# Patient Record
Sex: MALE | URBAN - METROPOLITAN AREA
[De-identification: names, ages, dates, MRNs, and addresses within clinical notes are randomized per-mention and may not be internally consistent; named-entity substitution may affect disease eponyms.]

---

## 2020-06-24 ENCOUNTER — TELEPHONE (OUTPATIENT)
Dept: UROLOGY | Facility: MEDICAL CENTER | Age: 50
End: 2020-06-24

## 2023-10-03 ENCOUNTER — HOSPITAL ENCOUNTER (EMERGENCY)
Facility: HOSPITAL | Age: 53
Discharge: HOME | End: 2023-10-03
Attending: EMERGENCY MEDICINE | Admitting: EMERGENCY MEDICINE
Payer: COMMERCIAL

## 2023-10-03 ENCOUNTER — APPOINTMENT (EMERGENCY)
Dept: RADIOLOGY | Facility: HOSPITAL | Age: 53
End: 2023-10-03
Payer: COMMERCIAL

## 2023-10-03 VITALS
WEIGHT: 212 LBS | SYSTOLIC BLOOD PRESSURE: 132 MMHG | HEART RATE: 79 BPM | DIASTOLIC BLOOD PRESSURE: 64 MMHG | OXYGEN SATURATION: 97 % | RESPIRATION RATE: 18 BRPM | TEMPERATURE: 97.9 F

## 2023-10-03 DIAGNOSIS — M71.122 SEPTIC OLECRANON BURSITIS OF LEFT ELBOW: Primary | ICD-10-CM

## 2023-10-03 LAB
ALBUMIN SERPL BCP-MCNC: 3.9 G/DL (ref 3.5–5)
ALP SERPL-CCNC: 75 U/L (ref 34–104)
ALT SERPL W P-5'-P-CCNC: 20 U/L (ref 7–52)
ANION GAP SERPL CALCULATED.3IONS-SCNC: 7 MMOL/L
APTT PPP: 38 SECONDS (ref 23–37)
AST SERPL W P-5'-P-CCNC: 25 U/L (ref 13–39)
BASOPHILS # BLD AUTO: 0.02 THOUSANDS/ÂΜL (ref 0–0.1)
BASOPHILS NFR BLD AUTO: 0 % (ref 0–1)
BILIRUB SERPL-MCNC: 1.42 MG/DL (ref 0.2–1)
BUN SERPL-MCNC: 12 MG/DL (ref 5–25)
CALCIUM SERPL-MCNC: 9.2 MG/DL (ref 8.4–10.2)
CARDIAC TROPONIN I PNL SERPL HS: 3 NG/L
CHLORIDE SERPL-SCNC: 103 MMOL/L (ref 96–108)
CO2 SERPL-SCNC: 24 MMOL/L (ref 21–32)
CREAT SERPL-MCNC: 0.56 MG/DL (ref 0.6–1.3)
EOSINOPHIL # BLD AUTO: 0.09 THOUSAND/ÂΜL (ref 0–0.61)
EOSINOPHIL NFR BLD AUTO: 2 % (ref 0–6)
ERYTHROCYTE [DISTWIDTH] IN BLOOD BY AUTOMATED COUNT: 13.6 % (ref 11.6–15.1)
GFR SERPL CREATININE-BSD FRML MDRD: 118 ML/MIN/1.73SQ M
GLUCOSE SERPL-MCNC: 112 MG/DL (ref 65–140)
HCT VFR BLD AUTO: 41.8 % (ref 36.5–49.3)
HGB BLD-MCNC: 14.8 G/DL (ref 12–17)
HYPERCHROMIA BLD QL SMEAR: PRESENT
IMM GRANULOCYTES # BLD AUTO: 0.01 THOUSAND/UL (ref 0–0.2)
IMM GRANULOCYTES NFR BLD AUTO: 0 % (ref 0–2)
INR PPP: 1.25 (ref 0.84–1.19)
LACTATE SERPL-SCNC: 0.9 MMOL/L (ref 0.5–2)
LYMPHOCYTES # BLD AUTO: 0.96 THOUSANDS/ÂΜL (ref 0.6–4.47)
LYMPHOCYTES NFR BLD AUTO: 19 % (ref 14–44)
MCH RBC QN AUTO: 33 PG (ref 26.8–34.3)
MCHC RBC AUTO-ENTMCNC: 35.4 G/DL (ref 31.4–37.4)
MCV RBC AUTO: 93 FL (ref 82–98)
MONOCYTES # BLD AUTO: 0.46 THOUSAND/ÂΜL (ref 0.17–1.22)
MONOCYTES NFR BLD AUTO: 9 % (ref 4–12)
NEUTROPHILS # BLD AUTO: 3.51 THOUSANDS/ÂΜL (ref 1.85–7.62)
NEUTS SEG NFR BLD AUTO: 70 % (ref 43–75)
NRBC BLD AUTO-RTO: 0 /100 WBCS
PLATELET # BLD AUTO: 96 THOUSANDS/UL (ref 149–390)
PLATELET BLD QL SMEAR: ABNORMAL
PMV BLD AUTO: 10.2 FL (ref 8.9–12.7)
POTASSIUM SERPL-SCNC: 3.5 MMOL/L (ref 3.5–5.3)
PROCALCITONIN SERPL-MCNC: 0.11 NG/ML
PROT SERPL-MCNC: 7 G/DL (ref 6.4–8.4)
PROTHROMBIN TIME: 16.3 SECONDS (ref 11.6–14.5)
RBC # BLD AUTO: 4.48 MILLION/UL (ref 3.88–5.62)
RBC MORPH BLD: PRESENT
SODIUM SERPL-SCNC: 134 MMOL/L (ref 135–147)
WBC # BLD AUTO: 5.05 THOUSAND/UL (ref 4.31–10.16)

## 2023-10-03 PROCEDURE — 36415 COLL VENOUS BLD VENIPUNCTURE: CPT

## 2023-10-03 PROCEDURE — 84145 PROCALCITONIN (PCT): CPT | Performed by: EMERGENCY MEDICINE

## 2023-10-03 PROCEDURE — 73080 X-RAY EXAM OF ELBOW: CPT

## 2023-10-03 PROCEDURE — 84484 ASSAY OF TROPONIN QUANT: CPT | Performed by: EMERGENCY MEDICINE

## 2023-10-03 PROCEDURE — 85025 COMPLETE CBC W/AUTO DIFF WBC: CPT | Performed by: EMERGENCY MEDICINE

## 2023-10-03 PROCEDURE — 99283 EMERGENCY DEPT VISIT LOW MDM: CPT

## 2023-10-03 PROCEDURE — 87205 SMEAR GRAM STAIN: CPT | Performed by: EMERGENCY MEDICINE

## 2023-10-03 PROCEDURE — 20605 DRAIN/INJ JOINT/BURSA W/O US: CPT | Performed by: EMERGENCY MEDICINE

## 2023-10-03 PROCEDURE — 83605 ASSAY OF LACTIC ACID: CPT | Performed by: EMERGENCY MEDICINE

## 2023-10-03 PROCEDURE — 85730 THROMBOPLASTIN TIME PARTIAL: CPT | Performed by: EMERGENCY MEDICINE

## 2023-10-03 PROCEDURE — 87147 CULTURE TYPE IMMUNOLOGIC: CPT | Performed by: EMERGENCY MEDICINE

## 2023-10-03 PROCEDURE — 87040 BLOOD CULTURE FOR BACTERIA: CPT | Performed by: EMERGENCY MEDICINE

## 2023-10-03 PROCEDURE — 80053 COMPREHEN METABOLIC PANEL: CPT | Performed by: EMERGENCY MEDICINE

## 2023-10-03 PROCEDURE — 99285 EMERGENCY DEPT VISIT HI MDM: CPT | Performed by: EMERGENCY MEDICINE

## 2023-10-03 PROCEDURE — 85610 PROTHROMBIN TIME: CPT | Performed by: EMERGENCY MEDICINE

## 2023-10-03 PROCEDURE — 87186 SC STD MICRODIL/AGAR DIL: CPT | Performed by: EMERGENCY MEDICINE

## 2023-10-03 PROCEDURE — 87070 CULTURE OTHR SPECIMN AEROBIC: CPT | Performed by: EMERGENCY MEDICINE

## 2023-10-03 RX ORDER — DOXYCYCLINE HYCLATE 100 MG/1
100 CAPSULE ORAL ONCE
Status: COMPLETED | OUTPATIENT
Start: 2023-10-03 | End: 2023-10-03

## 2023-10-03 RX ORDER — LIDOCAINE HYDROCHLORIDE 10 MG/ML
5 INJECTION, SOLUTION EPIDURAL; INFILTRATION; INTRACAUDAL; PERINEURAL ONCE
Status: COMPLETED | OUTPATIENT
Start: 2023-10-03 | End: 2023-10-03

## 2023-10-03 RX ORDER — DOXYCYCLINE 100 MG/1
100 CAPSULE ORAL 2 TIMES DAILY
Qty: 20 CAPSULE | Refills: 0 | Status: SHIPPED | OUTPATIENT
Start: 2023-10-03 | End: 2023-10-13

## 2023-10-03 RX ADMIN — DOXYCYCLINE 100 MG: 100 CAPSULE ORAL at 19:58

## 2023-10-03 RX ADMIN — LIDOCAINE HYDROCHLORIDE 5 ML: 10 INJECTION, SOLUTION EPIDURAL; INFILTRATION; INTRACAUDAL; PERINEURAL at 19:54

## 2023-10-03 NOTE — ED PROVIDER NOTES
History  Chief Complaint   Patient presents with   • Joint Swelling     L elbow redness and swelling since Saturday. Reports seen at urgent care yesterday, on keflex, redness and swelling worse. History provided by:  Patient  Elbow Pain  Location:  Elbow  Elbow location:  L elbow  Injury: no    Pain details:     Quality:  Aching and dull    Radiates to:  Does not radiate    Severity:  Moderate    Onset quality:  Gradual    Duration:  3 days    Timing:  Constant    Progression:  Worsening  Prior injury to area:  No  Relieved by:  Nothing  Worsened by:  Nothing  Ineffective treatments: was started on keflex yesterday and took 5 doses and gto worse and came in. Associated symptoms: swelling    Associated symptoms: no back pain, no decreased range of motion, no fatigue, no fever, no muscle weakness, no neck pain, no numbness, no stiffness and no tingling    Risk factors: no concern for non-accidental trauma        None       History reviewed. No pertinent past medical history. History reviewed. No pertinent surgical history. History reviewed. No pertinent family history. I have reviewed and agree with the history as documented. 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 Musculoskeletal: Negative for back pain, neck pain and stiffness. All other systems reviewed and are negative. Physical Exam  Physical Exam  Vitals and nursing note reviewed. Constitutional:       General: He is not in acute distress. Appearance: He is well-developed. He is not diaphoretic.    HENT:      Head: Normocephalic and atraumatic. Nose: Nose normal.   Eyes:      Conjunctiva/sclera: Conjunctivae normal.   Cardiovascular:      Rate and Rhythm: Normal rate and regular rhythm. Heart sounds: Normal heart sounds. Pulmonary:      Effort: Pulmonary effort is normal. No respiratory distress. Breath sounds: Normal breath sounds. Abdominal:      Palpations: Abdomen is soft. Musculoskeletal:         General: Normal range of motion. Left elbow: Swelling present. No deformity or lacerations. Normal range of motion. Tenderness present in olecranon process (swelling over left olcreanon bursa, ). No radial head, medial epicondyle or lateral epicondyle tenderness. Cervical back: Normal range of motion and neck supple. Skin:     General: Skin is warm and dry. Neurological:      Mental Status: He is alert and oriented to person, place, and time.              Vital Signs  ED Triage Vitals   Temperature Pulse Respirations Blood Pressure SpO2   10/03/23 1627 10/03/23 1627 10/03/23 1627 10/03/23 1627 10/03/23 1627   97.9 °F (36.6 °C) 79 18 132/64 97 %      Temp src Heart Rate Source Patient Position - Orthostatic VS BP Location FiO2 (%)   -- 10/03/23 1627 -- -- --    Monitor         Pain Score       10/03/23 1629       2           Vitals:    10/03/23 1627   BP: 132/64   Pulse: 79         Visual Acuity      ED Medications  Medications   lidocaine (PF) (XYLOCAINE-MPF) 1 % injection 5 mL (5 mL Infiltration Given by Other 10/3/23 1954)   doxycycline hyclate (VIBRAMYCIN) capsule 100 mg (100 mg Oral Given 10/3/23 1958)       Diagnostic Studies  Results Reviewed     Procedure Component Value Units Date/Time    Wound culture and Gram stain [[id number]]  (Abnormal) Collected: 10/03/23 1944    Lab Status: Preliminary result Specimen: Wound from Arm, Right Updated: 10/04/23 1355     Wound Culture 1+ Growth of Staphylococcus aureus     Gram Stain Result 3+ Polys      No bacteria seen    Blood culture #1 [[id number]] Collected: 10/03/23 1635    Lab Status: Preliminary result Specimen: Blood from Arm, Right Updated: 10/04/23 0101     Blood Culture Received in Microbiology Lab. Culture in Progress. Smear Review(Phlebs Do Not Order) [[id number]]  (Abnormal) Collected: 10/03/23 1633    Lab Status: Final result Specimen: Blood from Arm, Right Updated: 10/03/23 1739     RBC Morphology Present     Platelet Estimate Decreased     Hypochromia Present    CBC and differential [[id number]]  (Abnormal) Collected: 10/03/23 1633    Lab Status: Final result Specimen: Blood from Arm, Right Updated: 10/03/23 1739     WBC 5.05 Thousand/uL      RBC 4.48 Million/uL      Hemoglobin 14.8 g/dL      Hematocrit 41.8 %      MCV 93 fL      MCH 33.0 pg      MCHC 35.4 g/dL      RDW 13.6 %      MPV 10.2 fL      Platelets 96 Thousands/uL      nRBC 0 /100 WBCs      Neutrophils Relative 70 %      Immat GRANS % 0 %      Lymphocytes Relative 19 %      Monocytes Relative 9 %      Eosinophils Relative 2 %      Basophils Relative 0 %      Neutrophils Absolute 3.51 Thousands/µL      Immature Grans Absolute 0.01 Thousand/uL      Lymphocytes Absolute 0.96 Thousands/µL      Monocytes Absolute 0.46 Thousand/µL      Eosinophils Absolute 0.09 Thousand/µL      Basophils Absolute 0.02 Thousands/µL     Narrative: This is an appended report. These results have been appended to a previously verified report.     Procalcitonin [[id number]]  (Normal) Collected: 10/03/23 1633    Lab Status: Final result Specimen: Blood from Arm, Right Updated: 10/03/23 1713     Procalcitonin 0.11 ng/ml     HS Troponin 0hr (reflex protocol) [[id number]]  (Normal) Collected: 10/03/23 1633    Lab Status: Final result Specimen: Blood from Arm, Right Updated: 10/03/23 1711     hs TnI 0hr 3 ng/L     APTT [[id number]]  (Abnormal) Collected: 10/03/23 1633    Lab Status: Final result Specimen: Blood from Arm, Right Updated: 10/03/23 1704     PTT 38 seconds     Protime-INR [[id number]]  (Abnormal) Collected: 10/03/23 1633    Lab Status: Final result Specimen: Blood from Arm, Right Updated: 10/03/23 1704     Protime 16.3 seconds      INR 1.25    Lactic acid, plasma (w/reflex if result > 2.0) [[id number]]  (Normal) Collected: 10/03/23 1633    Lab Status: Final result Specimen: Blood from Arm, Right Updated: 10/03/23 1702     LACTIC ACID 0.9 mmol/L     Narrative:      Result may be elevated if tourniquet was used during collection. Comprehensive metabolic panel [[id number]]  (Abnormal) Collected: 10/03/23 1633    Lab Status: Final result Specimen: Blood from Arm, Right Updated: 10/03/23 1702     Sodium 134 mmol/L      Potassium 3.5 mmol/L      Chloride 103 mmol/L      CO2 24 mmol/L      ANION GAP 7 mmol/L      BUN 12 mg/dL      Creatinine 0.56 mg/dL      Glucose 112 mg/dL      Calcium 9.2 mg/dL      AST 25 U/L      ALT 20 U/L      Alkaline Phosphatase 75 U/L      Total Protein 7.0 g/dL      Albumin 3.9 g/dL      Total Bilirubin 1.42 mg/dL      eGFR 118 ml/min/1.73sq m     Narrative:      [place] guidelines for Chronic Kidney Disease (CKD):   •  Stage 1 with normal or high GFR (GFR > 90 mL/min/1.73 square meters)  •  Stage 2 Mild CKD (GFR = 60-89 mL/min/1.73 square meters)  •  Stage 3A Moderate CKD (GFR = 45-59 mL/min/1.73 square meters)  •  Stage 3B Moderate CKD (GFR = 30-44 mL/min/1.73 square meters)  •  Stage 4 Severe CKD (GFR = 15-29 mL/min/1.73 square meters)  •  Stage 5 End Stage CKD (GFR <15 mL/min/1.73 square meters)  Note: GFR calculation is accurate only with a steady state creatinine                 XR elbow 3+ vw LEFT   ED Interpretation by Cira Benitez MD (10/03 1754)   NAD      Final Result by Rocio Marie MD (10/04 0545)      No acute osseous abnormality. There is soft tissue swelling posterior to the elbow and proximal forearm. Correlate for cellulitis and/or olecranon bursitis.       Workstation performed: [id number]                    Procedures  Incision and drain    Date/Time: 10/3/2023 7:30 PM    Performed by: Beryle Pence, MD  Authorized by: Beryle Pence, MD  Universal Protocol:  Consent: Verbal consent obtained. Consent given by: patient  Timeout called at: 10/3/2023 7:30 PM.    Location:     Type:  [facility]    Location:  Upper extremity    Upper extremity location:  L elbow  Pre-procedure details:     Skin preparation:  Chloraprep  Anesthesia (see MAR for exact dosages): Anesthesia method:  Local infiltration    Local anesthetic:  Lidocaine 1% w/o epi  Procedure details:     Complexity:  Simple    Needle aspiration: yes      Needle size:  18 G    Drainage:  Serous    Packing materials:  None  Post-procedure details:     Patient tolerance of procedure: Tolerated well, no immediate complications             ED Course  ED Course as of 10/04/23 1908   Tue Oct 03, 2023   1946 Had d/w ortho, could stay with ortho consult or if reliable and not toxic with normal markers could aspirate and get fluid to send for cx and change abx and give ortho f/u with strict return instructions. Gave patient both options to be admitted versus trial of outpatient with abx change after aspiration and pt wanted trial again of outpt with abx and ortho f/u and return for worsening. SBIRT 20yo+    Flowsheet Row Most Recent Value   Initial Alcohol Screen: US AUDIT-C     1. How often do you have a drink containing alcohol? 0 Filed at: 10/03/2023 1628   2. How many drinks containing alcohol do you have on a typical day you are drinking? 0 Filed at: 10/03/2023 1628   3a. Male UNDER 65: How often do you have five or more drinks on one occasion? 0 Filed at: 10/03/2023 1628   3b. FEMALE Any Age, or MALE 65+: How often do you have 4 or more drinks on one occassion? 0 Filed at: 10/03/2023 1628   Audit-C Score 0 Filed at: 10/03/2023 1628   ALFRED: How many times in the past year have you. ..     Used an illegal drug or used a prescription medication for non-medical reasons? Never Filed at: 10/03/2023 1628                    Medical Decision Making  30-year-old male is coming in with complaint of swelling to his left lateral elbow, he does not remember specific trauma but he thought there might have been a bite there or scratch and has gradually gotten more swollen. He is able to range and bend his elbow. He has no pain with supination or pronation. He has not had any fever or chills. No nausea or vomiting. Was seen in urgent care and was given Keflex but the symptoms have continued to worsen since then. Patient here has swelling over his olecranon process with tenderness and fluid in the olecranon bursa which was consistent with an olecranon bursitis question of olecranon bursitis versus septic olecranon bursitis. We will check labs for markers of infection. He does not have any tracking or radiation beyond the area. We will get CBC BMP lactate Pro-Benjamín.  We will get x-ray of his elbow. Will discuss with Ortho. Discussion with some admission with IV antibiotics and Ortho consult versus attempt to tap and drain and change to stronger antibiotics with broader potential coverage. Septic olecranon bursitis of left elbow: acute illness or injury  Amount and/or Complexity of Data Reviewed  Labs: ordered. Radiology: ordered and independent interpretation performed. Risk  Prescription drug management. Decision regarding hospitalization. Disposition  Final diagnoses:   Septic olecranon bursitis of left elbow     Time reflects when diagnosis was documented in both MDM as applicable and the Disposition within this note     Time User Action Codes Description Comment    10/3/2023  7:48 PM Alicia Heath Add [B84.268] Septic olecranon bursitis of left elbow       ED Disposition     ED Disposition   Discharge    Condition   Stable    Date/Time   Tue Oct 3, 2023  7:48 PM    Comment   Violetta Alegre discharge to home/self care.                Follow-up Information     Follow up With Specialties Details Why Contact Info    Monae Herron MD Internal Medicine   [address] [place] [address]  [phone]            Discharge Medication List as of 10/3/2023  7:50 PM      START taking these medications    Details   doxycycline monohydrate (MONODOX) 100 mg capsule Take 1 capsule (100 mg total) by mouth 2 (two) times a day for 10 days, Starting Tue 10/3/2023, Until Fri 10/13/2023, Normal                 PDMP Review     None          ED Provider  Electronically Signed by           Quyen Benz MD  10/04/23 6737

## 2023-10-05 LAB
BACTERIA WND AEROBE CULT: ABNORMAL
BACTERIA WND AEROBE CULT: ABNORMAL
GRAM STN SPEC: ABNORMAL
GRAM STN SPEC: ABNORMAL

## 2023-10-05 NOTE — RESULT ENCOUNTER NOTE
Patient discharged on doxycycline which provides appropriate coverage of wound per review of susceptibility data

## 2023-10-09 LAB — BACTERIA BLD CULT: NORMAL

## 2024-05-17 ENCOUNTER — HOSPITAL ENCOUNTER (EMERGENCY)
Facility: HOSPITAL | Age: 54
Discharge: HOME | End: 2024-05-17
Attending: EMERGENCY MEDICINE
Payer: COMMERCIAL

## 2024-05-17 VITALS
BODY MASS INDEX: 30.06 KG/M2 | OXYGEN SATURATION: 98 % | RESPIRATION RATE: 18 BRPM | SYSTOLIC BLOOD PRESSURE: 126 MMHG | HEIGHT: 70 IN | WEIGHT: 210 LBS | HEART RATE: 65 BPM | TEMPERATURE: 98 F | DIASTOLIC BLOOD PRESSURE: 69 MMHG

## 2024-05-17 DIAGNOSIS — M70.21 OLECRANON BURSITIS OF RIGHT ELBOW: Primary | ICD-10-CM

## 2024-05-17 DIAGNOSIS — L03.90 CELLULITIS: ICD-10-CM

## 2024-05-17 LAB
APPEARANCE FLD: ABNORMAL
COLOR FLD: YELLOW
LYMPHOCYTES NFR BLD AUTO: 10 %
MONOCYTES NFR BLD AUTO: 13 %
NEUTS SEG NFR BLD AUTO: 77 %
SITE: ABNORMAL
TOTAL CELLS COUNTED SPEC: 100
TOTAL NUCLEATED CELL COUNT: ABNORMAL /UL

## 2024-05-17 PROCEDURE — 99283 EMERGENCY DEPT VISIT LOW MDM: CPT

## 2024-05-17 PROCEDURE — 99284 EMERGENCY DEPT VISIT MOD MDM: CPT

## 2024-05-17 PROCEDURE — 10160 PNXR ASPIR ABSC HMTMA BULLA: CPT

## 2024-05-17 PROCEDURE — 89051 BODY FLUID CELL COUNT: CPT

## 2024-05-17 RX ORDER — DOXYCYCLINE HYCLATE 100 MG/1
100 CAPSULE ORAL 2 TIMES DAILY
Qty: 20 CAPSULE | Refills: 0 | Status: SHIPPED | OUTPATIENT
Start: 2024-05-17 | End: 2024-05-27

## 2024-05-17 RX ORDER — DOXYCYCLINE HYCLATE 100 MG/1
100 CAPSULE ORAL ONCE
Status: COMPLETED | OUTPATIENT
Start: 2024-05-17 | End: 2024-05-17

## 2024-05-17 RX ADMIN — DOXYCYCLINE HYCLATE 100 MG: 100 CAPSULE ORAL at 01:31

## 2024-05-17 NOTE — DISCHARGE INSTRUCTIONS
Today I provided prescription for doxycycline.  Take as directed for treatment of olecranon bursitis, cellulitis.  Please monitor for signs of worsening of infection.  Return to ED for any signs of worsening of the infection despite taking doxycycline.  Follow-up with primary care provider as needed.

## 2024-05-17 NOTE — ED PROVIDER NOTES
History  Chief Complaint   Patient presents with    Cellulitis     Pt presents with R arm cellulitis. States seen at  and started on Keflex, pt states still currently on abx however states the redness is spreading. Denies fevers at home.      53-year-old male presents to ED for evaluation of right arm cellulitis.  Patient states that for the past 3 days he has been experiencing erythema, swelling, pain which began at his right elbow.  It has spread towards his hand, up his arm towards his shoulder.  He was seen in urgent care yesterday and was started on Keflex.  He has taken several doses of the Keflex without improvement of the erythema, pain, swelling.  Actually noticed that the symptoms are worse.  Patient concerned about worsening of infection.  He believes the infection started from a cut that he sustained 2 weeks ago on his elbow.  Denies fever, chills, nausea, vomiting, shortness of breath, chest pain.    Last year patient had very similar situation with his left elbow.  He was seen in the ED and had it drained.  Was started on doxycycline and it quickly resolved.  Patient hoping to have the same performed today.          None       History reviewed. No pertinent past medical history.    History reviewed. No pertinent surgical history.    History reviewed. No pertinent family history.  I have reviewed and agree with the history as documented.    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      Physical Exam  Physical Exam  Vitals and nursing note reviewed.    Constitutional:       General: He is not in acute distress.     Appearance: Normal appearance. He is well-developed. He is not ill-appearing, toxic-appearing or diaphoretic.   HENT:      Head: Normocephalic and atraumatic.   Eyes:      Conjunctiva/sclera: Conjunctivae normal.   Cardiovascular:      Rate and Rhythm: Normal rate and regular rhythm.      Pulses: Normal pulses.      Heart sounds: Normal heart sounds. No murmur heard.     No friction rub. No gallop.   Pulmonary:      Effort: Pulmonary effort is normal. No respiratory distress.      Breath sounds: No stridor. No wheezing, rhonchi or rales.   Musculoskeletal:      Right forearm: Swelling and tenderness present. No lacerations.      Left forearm: Normal.      Cervical back: Neck supple.      Comments: Full ROM of right elbow with flexion and extension.  No pain with flexion and extension.  Fluctuance palpated at olecranon bursa.  See attached picture.  No open wounds seen.  2+ radial pulse.  Brisk capillary refill.   Skin:     General: Skin is warm and dry.      Capillary Refill: Capillary refill takes less than 2 seconds.      Coloration: Skin is not jaundiced or pale.      Findings: No rash.   Neurological:      Mental Status: He is alert.   Psychiatric:         Mood and Affect: Mood normal.                 Vital Signs  ED Triage Vitals [05/17/24 0012]   Temperature Pulse Respirations Blood Pressure SpO2   98 °F (36.7 °C) 65 18 126/69 98 %      Temp Source Heart Rate Source Patient Position - Orthostatic VS BP Location FiO2 (%)   Temporal Monitor Sitting Left arm --      Pain Score       --           Vitals:    05/17/24 0012   BP: 126/69   Pulse: 65   Patient Position - Orthostatic VS: Sitting         Visual Acuity      ED Medications  Medications   doxycycline hyclate (VIBRAMYCIN) capsule 100 mg (100 mg Oral Given 5/17/24 0131)       Diagnostic Studies  Results Reviewed       Procedure Component Value Units Date/Time    Body Fluid Diff [[id number]]  Collected: 05/17/24 0139    Lab Status: Final result Specimen: Body Fluid from Other Updated: 05/17/24 0313     Total Counted 100     Neutrophils % (Fluid) 77 %      Lymphs % (Fluid) 10 %      Monocytes % (Fluid) 13 %     Body fluid white cell count with differential [[id number]]  (Abnormal) Collected: 05/17/24 0139    Lab Status: Final result Specimen: Body Fluid from Other Updated: 05/17/24 0258     Site right olecranon bursa ( right elbow)     Color, Fluid Yellow     Clarity, Fluid Cloudy     WBC, Fluid --     TOTAL NUCLEATED CELL COUNT 21,979 /ul     Narrative:      See TNC count                   No orders to display              Procedures  Incision and drain    Date/Time: 5/17/2024 1:10 AM    Performed by: Román Olivares PA-C  Authorized by: Román Olivares PA-C  Universal Protocol:  Consent: Verbal consent obtained.  Risks and benefits: risks, benefits and alternatives were discussed  Consent given by: patient  Timeout called at: 5/17/2024 1:10 AM.    Patient location:  ED  Location:     Type:  Bursa    Location:  Upper extremity    Upper extremity location:  R elbow  Pre-procedure details:     Skin preparation:  Antiseptic wash  Procedure details:     Complexity:  Simple    Needle aspiration: yes      Needle size:  22 G    Aspiration type: puncture aspiration      Approach:  Puncture    Drainage:  Purulent    Drainage amount: 5 cc.  Post-procedure details:     Patient tolerance of procedure:  Tolerated well, no immediate complications           ED Course                               SBIRT 22yo+      Flowsheet Row Most Recent Value   Initial Alcohol Screen: US AUDIT-C     1. How often do you have a drink containing alcohol? 0 Filed at: 05/17/2024 0046   2. How many drinks containing alcohol do you have on a typical day you are drinking?  0 Filed at: 05/17/2024 0046   3a. Male UNDER 65: How often do you have five or more drinks on one occasion? 0 Filed at: 05/17/2024 0046   Audit-C Score 0 Filed  at: 05/17/2024 0046   ALFRED: How many times in the past year have you...    Used an illegal drug or used a prescription medication for non-medical reasons? Never Filed at: 05/17/2024 0046                      Medical Decision Making  53-year-old male presents to ED for evaluation of right arm cellulitis as above.  On physical examination patient's right arm has erythema, swelling.  Fluctuance of the olecranon bursa.  Presentation consistent with bursitis, possible septic bursitis.  Do not suspect septic elbow at this time as patient does not have pain with micromotion movement of elbow.  Presentation appears similar to situation he had last year.  Patient without symptoms to suggest systemic infection.  Patient would like to have olecranon bursa drained.  Performed aspiration of olecranon bursa.  Was able to obtain 5 cc of cloudy, purulent, yellow fluid.  Plan to send for analysis.  Will change patient to doxycycline instead of Keflex as Keflex does not appear to be benefiting patient's infection.  Advised patient to return to ED for new or worsening symptoms.  Follow-up with primary care provider as needed.  Patient in agreement with plan.  Patient discharged.    Prior to discharge, discharge instructions were discussed with patient at bedside. Patient was provided both verbal and written instructions. Patient is understanding of the discharge instructions and is agreeable to plan of care. Return precautions were discussed with patient bedside, patient verbalized understanding of signs and symptoms that would necessitate return to the ED. All questions were answered. Patient was comfortable with the plan of care and discharged to home.     Amount and/or Complexity of Data Reviewed  Labs: ordered.    Risk  Prescription drug management.             Disposition  Final diagnoses:   Olecranon bursitis of right elbow   Cellulitis     Time reflects when diagnosis was documented in both MDM as applicable and the Disposition  within this note       Time User Action Codes Description Comment    5/17/2024  1:24 AM Román Olivares [M70.21] Olecranon bursitis of right elbow     5/17/2024  1:24 AM Román Olivares [L03.90] Cellulitis           ED Disposition       ED Disposition   Discharge    Condition   Stable    Date/Time   Fri May 17, 2024 0124    Comment   Kishore Pedrito discharge to home/self care.                   Follow-up Information       Follow up With Specialties Details Why Contact Info    Delano Duffy MD Internal Medicine   [address] [place] [address]  [address]  [place] [address]  [phone]              Discharge Medication List as of 5/17/2024  1:27 AM        START taking these medications    Details   doxycycline hyclate (VIBRAMYCIN) 100 mg capsule Take 1 capsule (100 mg total) by mouth 2 (two) times a day for 10 days, Starting Fri 5/17/2024, Until Mon 5/27/2024, Normal             No discharge procedures on file.    PDMP Review       None            ED Provider  Electronically Signed by             Román Olivares PA-C  05/17/24 0831

## 2024-05-22 LAB
APPEARANCE FLD: ABNORMAL
COLOR FLD: YELLOW
SITE: ABNORMAL
WBC # FLD MANUAL: ABNORMAL /UL

## 2024-05-29 ENCOUNTER — HOSPITAL ENCOUNTER (EMERGENCY)
Facility: HOSPITAL | Age: 54
Discharge: HOME | End: 2024-05-29
Attending: EMERGENCY MEDICINE
Payer: COMMERCIAL

## 2024-05-29 VITALS
SYSTOLIC BLOOD PRESSURE: 158 MMHG | RESPIRATION RATE: 18 BRPM | DIASTOLIC BLOOD PRESSURE: 73 MMHG | HEART RATE: 76 BPM | TEMPERATURE: 97.9 F | OXYGEN SATURATION: 97 %

## 2024-05-29 DIAGNOSIS — M70.21 OLECRANON BURSITIS OF RIGHT ELBOW: ICD-10-CM

## 2024-05-29 DIAGNOSIS — L03.113 CELLULITIS OF RIGHT UPPER EXTREMITY: Primary | ICD-10-CM

## 2024-05-29 PROCEDURE — 99282 EMERGENCY DEPT VISIT SF MDM: CPT

## 2024-05-29 PROCEDURE — 99284 EMERGENCY DEPT VISIT MOD MDM: CPT | Performed by: PHYSICIAN ASSISTANT

## 2024-05-29 RX ORDER — CLINDAMYCIN HYDROCHLORIDE 300 MG/1
300 CAPSULE ORAL 4 TIMES DAILY
Qty: 40 CAPSULE | Refills: 0 | Status: SHIPPED | OUTPATIENT
Start: 2024-05-29 | End: 2024-06-08

## 2024-05-29 NOTE — ED PROVIDER NOTES
History  Chief Complaint   Patient presents with    Wound Check     Pt reports he finished the antibiotics he was on for wound on his right elbow but the redness and swelling has increased.      53-year-old male presents emergency department with complaints of right-sided elbow redness and pain.  States that he was seen approximately 12 days ago for similar symptoms and had the area drained.  Completed a 10-day course of doxycycline 1 to 2 days ago and has had some increasing redness and discomfort since that time.  Denies fevers.  No drainage from the area.  States that although symptoms have gotten slightly worse since finishing his antibiotics, they have not reached the level of previous swelling or redness.      History provided by:  Patient   used: No    Wound Check         None       History reviewed. No pertinent past medical history.    History reviewed. No pertinent surgical history.    History reviewed. No pertinent family history.  I have reviewed and agree with the history as documented.    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   Respiratory:  Negative for cough.    Cardiovascular:  Negative for chest pain.   Musculoskeletal:  Positive for arthralgias (elbow pain). Negative for back pain.   Skin:  Positive for color change. Negative for rash and wound.   All other systems reviewed and are negative.      Physical Exam  Physical Exam  Vitals and nursing note reviewed.   Constitutional:       Appearance: He is well-developed.   HENT:      Head: Normocephalic and atraumatic.   Cardiovascular:       Rate and Rhythm: Normal rate and regular rhythm.   Pulmonary:      Effort: Pulmonary effort is normal. No respiratory distress.      Breath sounds: No wheezing, rhonchi or rales.   Musculoskeletal:      Right elbow: No swelling, deformity, effusion or lacerations. Normal range of motion. Tenderness present.      Comments: Localized swelling over the olecranon bursa with some erythema and tenderness to palpation.  Mild induration of the skin distally.   Skin:     General: Skin is warm and dry.   Neurological:      Mental Status: He is alert and oriented to person, place, and time.   Psychiatric:         Mood and Affect: Mood normal.         Behavior: Behavior normal.         Vital Signs  ED Triage Vitals   Temperature Pulse Respirations Blood Pressure SpO2   05/29/24 1333 05/29/24 1333 05/29/24 1333 05/29/24 1336 05/29/24 1333   97.9 °F (36.6 °C) 76 18 158/73 97 %      Temp src Heart Rate Source Patient Position - Orthostatic VS BP Location FiO2 (%)   -- 05/29/24 1333 -- -- --    Monitor         Pain Score       --                  Vitals:    05/29/24 1333 05/29/24 1336   BP:  158/73   Pulse: 76          Visual Acuity      ED Medications  Medications - No data to display    Diagnostic Studies  Results Reviewed       None                   No orders to display              Procedures  Procedures         ED Course                               SBIRT 20yo+      Flowsheet Row Most Recent Value   Initial Alcohol Screen: US AUDIT-C     1. How often do you have a drink containing alcohol? 0 Filed at: 05/29/2024 1514   2. How many drinks containing alcohol do you have on a typical day you are drinking?  0 Filed at: 05/29/2024 1514   3a. Male UNDER 65: How often do you have five or more drinks on one occasion? 0 Filed at: 05/29/2024 1514   Audit-C Score 0 Filed at: 05/29/2024 1514   ALFRED: How many times in the past year have you...    Used an illegal drug or used a prescription medication for non-medical reasons? Never  Filed at: 05/29/2024 1514                      Medical Decision Making  Differential diagnosis includes but not limited to: Olecranon bursitis, septic bursitis, cellulitis    Problems Addressed:  Cellulitis of right upper extremity: acute illness or injury  Olecranon bursitis of right elbow: acute illness or injury    Risk  Prescription drug management.             Disposition  Final diagnoses:   Cellulitis of right upper extremity   Olecranon bursitis of right elbow     Time reflects when diagnosis was documented in both MDM as applicable and the Disposition within this note       Time User Action Codes Description Comment    5/29/2024  3:05 PM Izzy Yee [L03.113] Cellulitis of right upper extremity     5/29/2024  3:06 PM Izzy Yee [M70.21] Olecranon bursitis of right elbow           ED Disposition       ED Disposition   Discharge    Condition   Stable    Date/Time   Wed May 29, 2024 1505    Comment   Kishore Major discharge to home/self care.                   Follow-up Information       Follow up With Specialties Details Why Contact Info    Delano Duffy MD Internal Medicine   [address] [place] [address]  [address]  [place] [address]  [phone]              Discharge Medication List as of 5/29/2024  3:09 PM        START taking these medications    Details   clindamycin (CLEOCIN) 300 MG capsule Take 1 capsule (300 mg total) by mouth 4 (four) times a day for 10 days, Starting Wed 5/29/2024, Until Sat 6/8/2024, Normal             No discharge procedures on file.    PDMP Review       None            ED Provider  Electronically Signed by             Izzy Yee PA-C  05/29/24 8273

## 2024-12-11 ENCOUNTER — APPOINTMENT (EMERGENCY)
Dept: ULTRASOUND IMAGING | Facility: HOSPITAL | Age: 54
End: 2024-12-11
Payer: COMMERCIAL

## 2024-12-11 ENCOUNTER — HOSPITAL ENCOUNTER (EMERGENCY)
Facility: HOSPITAL | Age: 54
Discharge: HOME | End: 2024-12-11
Attending: EMERGENCY MEDICINE
Payer: COMMERCIAL

## 2024-12-11 VITALS
BODY MASS INDEX: 30.35 KG/M2 | RESPIRATION RATE: 18 BRPM | OXYGEN SATURATION: 99 % | DIASTOLIC BLOOD PRESSURE: 78 MMHG | SYSTOLIC BLOOD PRESSURE: 163 MMHG | HEART RATE: 70 BPM | WEIGHT: 212 LBS | TEMPERATURE: 98 F | HEIGHT: 70 IN

## 2024-12-11 DIAGNOSIS — N50.811 RIGHT TESTICULAR PAIN: Primary | ICD-10-CM

## 2024-12-11 DIAGNOSIS — N43.3 HYDROCELE: ICD-10-CM

## 2024-12-11 DIAGNOSIS — N50.89 SWELLING OF RIGHT TESTICLE: ICD-10-CM

## 2024-12-11 DIAGNOSIS — N50.89 EPIDIDYMAL MASS: ICD-10-CM

## 2024-12-11 LAB
BILIRUB UR QL STRIP: NEGATIVE
CLARITY UR: CLEAR
COLOR UR: NORMAL
GLUCOSE UR STRIP-MCNC: NEGATIVE MG/DL
HGB UR QL STRIP.AUTO: NEGATIVE
KETONES UR STRIP-MCNC: NEGATIVE MG/DL
LEUKOCYTE ESTERASE UR QL STRIP: NEGATIVE
NITRITE UR QL STRIP: NEGATIVE
PH UR STRIP.AUTO: 6.5 [PH]
PROT UR STRIP-MCNC: NEGATIVE MG/DL
SP GR UR STRIP.AUTO: 1.01 (ref 1–1.03)
UROBILINOGEN UR STRIP-ACNC: <2 MG/DL

## 2024-12-11 PROCEDURE — 99284 EMERGENCY DEPT VISIT MOD MDM: CPT

## 2024-12-11 PROCEDURE — 96372 THER/PROPH/DIAG INJ SC/IM: CPT

## 2024-12-11 PROCEDURE — 81003 URINALYSIS AUTO W/O SCOPE: CPT | Performed by: EMERGENCY MEDICINE

## 2024-12-11 PROCEDURE — 99284 EMERGENCY DEPT VISIT MOD MDM: CPT | Performed by: EMERGENCY MEDICINE

## 2024-12-11 PROCEDURE — 76870 US EXAM SCROTUM: CPT

## 2024-12-11 RX ORDER — KETOROLAC TROMETHAMINE 30 MG/ML
30 INJECTION, SOLUTION INTRAMUSCULAR; INTRAVENOUS ONCE
Status: COMPLETED | OUTPATIENT
Start: 2024-12-11 | End: 2024-12-11

## 2024-12-11 RX ORDER — ACETAMINOPHEN 325 MG/1
975 TABLET ORAL ONCE
Status: COMPLETED | OUTPATIENT
Start: 2024-12-11 | End: 2024-12-11

## 2024-12-11 RX ADMIN — ACETAMINOPHEN 975 MG: 325 TABLET ORAL at 18:01

## 2024-12-11 RX ADMIN — KETOROLAC TROMETHAMINE 30 MG: 30 INJECTION, SOLUTION INTRAMUSCULAR at 18:01

## 2024-12-11 NOTE — ED PROVIDER NOTES
Time reflects when diagnosis was documented in both MDM as applicable and the Disposition within this note       Time User Action Codes Description Comment    12/11/2024  6:48 PM Erne, Kwasi Add [N50.811] Right testicular pain     12/11/2024  6:48 PM Erne, Kwasi Add [N50.89] Swelling of right testicle     12/11/2024  7:59 PM Erne, Kwasi Add [N50.89] Epididymal mass     12/11/2024  7:59 PM Erne, Kwasi Add [N43.3] Hydrocele           ED Disposition       ED Disposition   Discharge    Condition   Stable    Date/Time   Wed Dec 11, 2024  7:58 PM    Comment   Kishore Pedrito discharge to home/self care.                   Assessment & Plan       Medical Decision Making  54-year-old male history of hypertension presenting with testicular pain.  Testicular pain and swelling.  No evidence of trauma.  Plan symptom management with oral and IM medications.  Testicular ultrasound.  Reassess.    Symptoms improved with medications.  Ultrasound notable for hydrocele and testicular mass.  Informed patient of results and need for follow-up and follow-up imaging.  Testicular pain precautions. Discussed results and recommendations. Advised follow up PCP and urology. Medication recommendations. Given instructions and return precautions. Patient/family at bedside acknowledged understanding of all written and verbal instructions and return precautions. Discharged.     Amount and/or Complexity of Data Reviewed  Radiology: ordered.    Risk  OTC drugs.  Prescription drug management.             Medications   acetaminophen (TYLENOL) tablet 975 mg (975 mg Oral Given 12/11/24 1801)   ketorolac (TORADOL) injection 30 mg (30 mg Intramuscular Given 12/11/24 1801)       ED Risk Strat Scores                          SBIRT 22yo+      Flowsheet Row Most Recent Value   Initial Alcohol Screen: US AUDIT-C     1. How often do you have a drink containing alcohol? 0 Filed at: 12/11/2024 2472   2. How many drinks containing alcohol do you have on a typical day  you are drinking?  0 Filed at: 12/11/2024 1730   3a. Male UNDER 65: How often do you have five or more drinks on one occasion? 0 Filed at: 12/11/2024 1730   Audit-C Score 0 Filed at: 12/11/2024 1730   ALFRED: How many times in the past year have you...    Used an illegal drug or used a prescription medication for non-medical reasons? Never Filed at: 12/11/2024 1730                            History of Present Illness       Chief Complaint   Patient presents with    Testicle Swelling     C/o cyst in R testicle. Has had the since he was a teenager. Today has become more swollen & painful.        History reviewed. No pertinent past medical history.   History reviewed. No pertinent surgical history.   History reviewed. No pertinent family history.   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     54-year-old male history of hypertension presenting with testicular pain.  Patient reports nonspecific structure near his right testicle that he has had for a majority of his life.  Patient reports occasional increased swelling and pain.  Patient reports more swelling and pain than usual in the last 24 hours.  Denies any known injury.  Denies any known trauma.  Denies any skin changes.  Denies any urinary changes such as pain or bleeding or abnormal discharge.  Reports pain radiates up into her right groin.  Denies abdominal pain.  Denies nausea vomiting diarrhea.  No reported new sexual contacts.  Denies any other complaints.  Chart reviewed.    History reviewed. No pertinent past medical history.  Family History: non-contributory  Social  History          Review of Systems   Constitutional:  Negative for appetite change, chills, diaphoresis, fever and unexpected weight change.   HENT:  Negative for congestion and rhinorrhea.    Eyes:  Negative for photophobia and visual disturbance.   Respiratory:  Negative for cough, chest tightness and shortness of breath.    Cardiovascular:  Negative for chest pain, palpitations and leg swelling.   Gastrointestinal:  Negative for abdominal distention, abdominal pain, blood in stool, constipation, diarrhea, nausea and vomiting.   Genitourinary:  Positive for testicular pain. Negative for dysuria, hematuria, penile discharge, penile pain, penile swelling and scrotal swelling.   Musculoskeletal:  Negative for back pain, joint swelling, neck pain and neck stiffness.   Skin:  Negative for color change, pallor, rash and wound.   Neurological:  Negative for dizziness, syncope, weakness, light-headedness and headaches.   Psychiatric/Behavioral:  Negative for agitation.    All other systems reviewed and are negative.          Objective       ED Triage Vitals   Temperature Pulse Blood Pressure Respirations SpO2 Patient Position - Orthostatic VS   12/11/24 1728 12/11/24 1728 12/11/24 1728 12/11/24 1728 12/11/24 1728 12/11/24 1728   98 °F (36.7 °C) 70 163/78 18 99 % Sitting      Temp Source Heart Rate Source BP Location FiO2 (%) Pain Score    12/11/24 1728 12/11/24 1728 12/11/24 1728 -- 12/11/24 1801    Temporal Monitor Left arm  7      Vitals      Date and Time Temp Pulse SpO2 Resp BP Pain Score FACES Pain Rating User   12/11/24 1801 -- -- -- -- -- 7 -- GB   12/11/24 1728 98 °F (36.7 °C) 70 99 % 18 163/78 -- -- RO            Physical Exam  Vitals and nursing note reviewed.   Constitutional:       General: He is not in acute distress.     Appearance: Normal appearance. He is well-developed. He is not ill-appearing, toxic-appearing or diaphoretic.   HENT:      Head: Normocephalic and atraumatic.      Nose: Nose normal. No  congestion or rhinorrhea.      Mouth/Throat:      Mouth: Mucous membranes are moist.      Pharynx: Oropharynx is clear. No oropharyngeal exudate or posterior oropharyngeal erythema.   Eyes:      General: No scleral icterus.        Right eye: No discharge.         Left eye: No discharge.      Extraocular Movements: Extraocular movements intact.      Conjunctiva/sclera: Conjunctivae normal.      Pupils: Pupils are equal, round, and reactive to light.   Neck:      Vascular: No JVD.      Trachea: No tracheal deviation.      Comments: Supple. Normal range of motion.   Cardiovascular:      Rate and Rhythm: Normal rate and regular rhythm.      Heart sounds: Normal heart sounds. No murmur heard.     No friction rub. No gallop.      Comments: Normal rate and regular rhythm  Pulmonary:      Effort: Pulmonary effort is normal. No respiratory distress.      Breath sounds: Normal breath sounds. No stridor. No wheezing or rales.      Comments: Clear to auscultation bilaterally  Chest:      Chest wall: No tenderness.   Abdominal:      General: Bowel sounds are normal. There is no distension.      Palpations: Abdomen is soft.      Tenderness: There is no abdominal tenderness. There is no right CVA tenderness, left CVA tenderness, guarding or rebound.      Comments: Soft, nontender, nondistended.  Normal bowel sounds throughout   Genitourinary:     Penis: Normal.       Comments: Cystic structure near the anterior aspect of the right testicle with overlying tenderness.  No skin changes.  No drainage.  No epididymal tenderness  Musculoskeletal:         General: No swelling, tenderness, deformity or signs of injury. Normal range of motion.      Cervical back: Normal range of motion and neck supple. No rigidity. No muscular tenderness.      Right lower leg: No edema.      Left lower leg: No edema.   Lymphadenopathy:      Cervical: No cervical adenopathy.   Skin:     General: Skin is warm and dry.      Coloration: Skin is not pale.       Findings: No erythema or rash.   Neurological:      General: No focal deficit present.      Mental Status: He is alert. Mental status is at baseline.      Sensory: No sensory deficit.      Motor: No weakness or abnormal muscle tone.      Coordination: Coordination normal.      Gait: Gait normal.      Comments: Alert.  Strength and sensation grossly intact.  Ambulatory without difficulty at baseline.    Psychiatric:         Behavior: Behavior normal.         Thought Content: Thought content normal.         Results Reviewed       Procedure Component Value Units Date/Time    UA w Reflex to Microscopic w Reflex to Culture [[id number]] Collected: 12/11/24 1922    Lab Status: Final result Specimen: Urine, Clean Catch Updated: 12/11/24 1932     Color, UA Light Yellow     Clarity, UA Clear     Specific Gravity, UA 1.009     pH, UA 6.5     Leukocytes, UA Negative     Nitrite, UA Negative     Protein, UA Negative mg/dl      Glucose, UA Negative mg/dl      Ketones, UA Negative mg/dl      Urobilinogen, UA <2.0 mg/dl      Bilirubin, UA Negative     Occult Blood, UA Negative            US scrotum and testicles   Final Interpretation by Obdulio Rose MD (12/11 1947)      4.1 cm heterogeneous mass adjacent to the left epididymal tail. Typically, solid appearing lesions of the epididymis are benign in etiology with the most common finding being adenomatoid tumor or sperm granuloma (in the setting of prior vasectomy).     There are other less common benign and malignant considerations as well, recommend urology consult and follow-up ultrasound in 3 months to confirm stability or sooner for increase in size/symptoms.      The study was marked in EPIC for immediate notification.      Workstation performed: [id number]             Procedures    ED Medication and Procedure Management   None     There are no discharge medications for this patient.    No discharge procedures on file.  ED SEPSIS DOCUMENTATION   Time reflects when  diagnosis was documented in both MDM as applicable and the Disposition within this note       Time User Action Codes Description Comment    12/11/2024  6:48 PM Kwasi Moon Add [N50.811] Right testicular pain     12/11/2024  6:48 PM Kwasi Moon Add [N50.89] Swelling of right testicle     12/11/2024  7:59 PM Kwasi Moon Add [N50.89] Epididymal mass     12/11/2024  7:59 PM Kwasi Moon Add [N43.3] Hydrocele                  Kwasi Moon MD  12/11/24 3108     (3) adequate

## 2024-12-12 NOTE — DISCHARGE INSTRUCTIONS
Please follow up PCP and urology. Recommend tylenol 650 mg and ibuprofen 600 mg every 6 hours as needed for pain. Please return for severe chest pain, significant shortness of breath, severely worsening symptoms, or any other concerning signs or symptoms. Please refer to the following documents for additional instructions and return precautions.     4.1 cm heterogeneous mass adjacent to the left epididymal tail. Typically, solid appearing lesions of the epididymis are benign in etiology with the most common finding being adenomatoid tumor or sperm granuloma (in the setting of prior vasectomy). , There are other less common benign and malignant considerations as well, recommend urology consult and follow-up ultrasound in 3 months to confirm stability or sooner for increase in size/symptoms